# Patient Record
Sex: MALE | Race: WHITE | NOT HISPANIC OR LATINO | Employment: OTHER | URBAN - METROPOLITAN AREA
[De-identification: names, ages, dates, MRNs, and addresses within clinical notes are randomized per-mention and may not be internally consistent; named-entity substitution may affect disease eponyms.]

---

## 2017-09-07 ENCOUNTER — HOSPITAL ENCOUNTER (OUTPATIENT)
Dept: GENERAL RADIOLOGY | Facility: HOSPITAL | Age: 32
Discharge: HOME | End: 2017-09-07
Admitting: NURSE PRACTITIONER

## 2017-09-07 DIAGNOSIS — R52 PAIN: ICD-10-CM

## 2017-09-07 PROCEDURE — 73630 X-RAY EXAM OF FOOT: CPT

## 2020-01-02 ENCOUNTER — TRANSCRIBE ORDERS (OUTPATIENT)
Dept: ADMINISTRATIVE | Facility: HOSPITAL | Age: 35
End: 2020-01-02

## 2020-01-02 ENCOUNTER — HOSPITAL ENCOUNTER (OUTPATIENT)
Dept: GENERAL RADIOLOGY | Facility: HOSPITAL | Age: 35
Discharge: HOME | End: 2020-01-02
Admitting: NURSE PRACTITIONER

## 2020-01-02 DIAGNOSIS — M54.2 PAINFUL NECK: ICD-10-CM

## 2020-01-02 DIAGNOSIS — M54.2 PAINFUL NECK: Primary | ICD-10-CM

## 2020-01-02 PROCEDURE — 72040 X-RAY EXAM NECK SPINE 2-3 VW: CPT

## 2021-08-16 NOTE — PROGRESS NOTES
New Left Knee      Patient: Ayden Knutson        YOB: 1985    Medical Record Number: [id number]        Chief Complaints: left knee pain      History of Present Illness: This is a 36-year-old male who presents complaining of left knee pain states the left knee began in April he took a weird step off a ladder after that had posterior swelling pain is medial and posterior he is tried ice heat over-the-counter medications all with no lasting improvement.  His symptoms are moderate intermittent 4 out of 10 stabbing aching with swelling worse with climbing stairs somewhat better with rest past medical history is marked for high blood pressure        Allergies: No Known Allergies    Medications:   Home Medications:  Current Outpatient Medications on File Prior to Visit   Medication Sig   • cetirizine (zyrTEC) 5 MG tablet Take 5 mg by mouth Daily.   • hydroCHLOROthiazide (HYDRODIURIL) 25 MG tablet Take 25 mg by mouth Daily.   • omeprazole (priLOSEC) 20 MG capsule Take 20 mg by mouth Daily.     No current facility-administered medications on file prior to visit.     Current Medications:  Scheduled Meds:  Continuous Infusions:No current facility-administered medications for this visit.    PRN Meds:.    History reviewed. No pertinent past medical history.   History reviewed. No pertinent surgical history.     Social History     Occupational History   • Not on file   Tobacco Use   • Smoking status: Never Smoker   • Smokeless tobacco: Never Used   Vaping Use   • Vaping Use: Never used   Substance and Sexual Activity   • Alcohol use: Not Currently   • Drug use: Defer   • Sexual activity: Defer      Social History     Social History Narrative   • Not on file        Family History   Problem Relation Age of Onset   • Diabetes Father              Review of Systems: 14 point review of systems are remarkable for the knee pain only the remainder negative per the patient    Review of Systems      Physical Exam: 36  "y.o. male  General Appearance:    Alert, cooperative, in no acute distress                   Vitals:    08/17/21 1602   Temp: 97.7 °F (36.5 °C)   Weight: 120 kg (265 lb)   Height: 185.4 cm (73\")   PainSc:   5      Patient is alert and read ×3 no acute distress appears her above-listed at height weight and age.  Affect is normal respiratory rate is normal unlabored. Heart rate regular rate rhythm, sclera, dentition and hearing are normal for the purpose of this exam.        Ortho Exam Physical exam of the left knee reveals no effusion no redness.  The patient does have tenderness about the medial joint line.  No tenderness about the lateral joint line.  A negative bounce home and a positive medial Leonel.  There is some pain medially  with a lateral Leonel.  Patient has a stable ligamentous exam.  Quads are reasonable and symmetric bilaterally.  Calf is soft and nontender.  There is no overlying skin changes no lymphedema lymphadenopathy.  Patient has good hip range of motion full symmetric and asymptomatic and a normal ankle exam.    Procedures             Radiology:   AP, Lateral and merchant views of the left knee  were ordered/reviewed to evauateknee pain.  I have no comparative films these are normal no evidence of any acute bony pathology  Imaging Results (Most Recent)     Procedure Component Value Units Date/Time    XR Knee 3 View Left [[id number]] Resulted: 08/17/21 1554     Updated: 08/17/21 1600    Impression:      Ordering physician's impression is located in the Encounter Note dated 08/17/21. X-ray performed in the DR room.          Assessment/Plan:  Left knee pain is been ongoing since April he has definitive mechanical symptoms he is tried anti-inflammatories Tylenol rest strengthening stretching all with no lasting improvement plan is to proceed with an MRI                                "

## 2021-08-17 ENCOUNTER — OFFICE VISIT (OUTPATIENT)
Dept: ORTHOPEDIC SURGERY | Facility: CLINIC | Age: 36
End: 2021-08-17

## 2021-08-17 VITALS — TEMPERATURE: 97.7 F | WEIGHT: 265 LBS | BODY MASS INDEX: 35.12 KG/M2 | HEIGHT: 73 IN

## 2021-08-17 DIAGNOSIS — R52 PAIN: Primary | ICD-10-CM

## 2021-08-17 DIAGNOSIS — S83.242A TEAR OF MEDIAL MENISCUS OF LEFT KNEE, CURRENT, UNSPECIFIED TEAR TYPE, INITIAL ENCOUNTER: ICD-10-CM

## 2021-08-17 PROCEDURE — 99203 OFFICE O/P NEW LOW 30 MIN: CPT | Performed by: ORTHOPAEDIC SURGERY

## 2021-08-17 PROCEDURE — 73562 X-RAY EXAM OF KNEE 3: CPT | Performed by: ORTHOPAEDIC SURGERY

## 2021-08-17 RX ORDER — HYDROCHLOROTHIAZIDE 25 MG/1
25 TABLET ORAL DAILY
COMMUNITY

## 2021-08-17 RX ORDER — OMEPRAZOLE 20 MG/1
20 CAPSULE, DELAYED RELEASE ORAL DAILY
COMMUNITY

## 2021-08-17 RX ORDER — CETIRIZINE HYDROCHLORIDE 5 MG/1
5 TABLET ORAL DAILY
COMMUNITY

## 2021-09-09 ENCOUNTER — APPOINTMENT (OUTPATIENT)
Dept: MRI IMAGING | Facility: HOSPITAL | Age: 36
End: 2021-09-09

## 2021-09-30 ENCOUNTER — HOSPITAL ENCOUNTER (OUTPATIENT)
Dept: MRI IMAGING | Facility: HOSPITAL | Age: 36
Discharge: HOME | End: 2021-09-30
Admitting: ORTHOPAEDIC SURGERY

## 2021-09-30 DIAGNOSIS — S83.242A TEAR OF MEDIAL MENISCUS OF LEFT KNEE, CURRENT, UNSPECIFIED TEAR TYPE, INITIAL ENCOUNTER: ICD-10-CM

## 2021-09-30 PROCEDURE — 73721 MRI JNT OF LWR EXTRE W/O DYE: CPT

## 2025-05-14 ENCOUNTER — HOSPITAL ENCOUNTER (EMERGENCY)
Facility: HOSPITAL | Age: 40
Discharge: HOME | End: 2025-05-14
Admitting: EMERGENCY MEDICINE
Payer: COMMERCIAL

## 2025-05-14 ENCOUNTER — APPOINTMENT (OUTPATIENT)
Dept: GENERAL RADIOLOGY | Facility: HOSPITAL | Age: 40
End: 2025-05-14
Payer: COMMERCIAL

## 2025-05-14 VITALS
HEIGHT: 73 IN | TEMPERATURE: 98.3 F | SYSTOLIC BLOOD PRESSURE: 124 MMHG | DIASTOLIC BLOOD PRESSURE: 83 MMHG | BODY MASS INDEX: 34.72 KG/M2 | RESPIRATION RATE: 18 BRPM | OXYGEN SATURATION: 98 % | HEART RATE: 90 BPM | WEIGHT: 262 LBS

## 2025-05-14 DIAGNOSIS — S60.551A FOREIGN BODY OF RIGHT HAND, INITIAL ENCOUNTER: Primary | ICD-10-CM

## 2025-05-14 PROCEDURE — 63710000001 ONDANSETRON ODT 4 MG TABLET DISPERSIBLE: Performed by: PHYSICIAN ASSISTANT

## 2025-05-14 PROCEDURE — 99283 EMERGENCY DEPT VISIT LOW MDM: CPT

## 2025-05-14 PROCEDURE — 96372 THER/PROPH/DIAG INJ SC/IM: CPT

## 2025-05-14 PROCEDURE — 73130 X-RAY EXAM OF HAND: CPT

## 2025-05-14 PROCEDURE — 90471 IMMUNIZATION ADMIN: CPT | Performed by: PHYSICIAN ASSISTANT

## 2025-05-14 PROCEDURE — 25010000002 CEFAZOLIN PER 500 MG: Performed by: PHYSICIAN ASSISTANT

## 2025-05-14 PROCEDURE — 25010000002 TETANUS-DIPHTH-ACELL PERTUSSIS 5-2.5-18.5 LF-MCG/0.5 SUSPENSION PREFILLED SYRINGE: Performed by: PHYSICIAN ASSISTANT

## 2025-05-14 PROCEDURE — 90715 TDAP VACCINE 7 YRS/> IM: CPT | Performed by: PHYSICIAN ASSISTANT

## 2025-05-14 RX ORDER — CEPHALEXIN 500 MG/1
500 CAPSULE ORAL 4 TIMES DAILY
Qty: 20 CAPSULE | Refills: 0 | Status: SHIPPED | OUTPATIENT
Start: 2025-05-14

## 2025-05-14 RX ORDER — CEFAZOLIN SODIUM 1 G/3ML
1 INJECTION, POWDER, FOR SOLUTION INTRAMUSCULAR; INTRAVENOUS ONCE
Status: COMPLETED | OUTPATIENT
Start: 2025-05-14 | End: 2025-05-14

## 2025-05-14 RX ORDER — ONDANSETRON 4 MG/1
8 TABLET, ORALLY DISINTEGRATING ORAL ONCE
Status: COMPLETED | OUTPATIENT
Start: 2025-05-14 | End: 2025-05-14

## 2025-05-14 RX ADMIN — CEFAZOLIN 1 G: 1 INJECTION, POWDER, FOR SOLUTION INTRAMUSCULAR; INTRAVENOUS at 14:12

## 2025-05-14 RX ADMIN — TETANUS TOXOID, REDUCED DIPHTHERIA TOXOID AND ACELLULAR PERTUSSIS VACCINE, ADSORBED 0.5 ML: 5; 2.5; 8; 8; 2.5 SUSPENSION INTRAMUSCULAR at 14:12

## 2025-05-14 RX ADMIN — ONDANSETRON 8 MG: 4 TABLET, ORALLY DISINTEGRATING ORAL at 14:46

## 2025-05-14 NOTE — ED PROVIDER NOTES
Subjective   History of Present Illness  39-year-old male presents emergency room with complaints of foreign body to right hand.  Patient states he was at home using a screw by a screw gun when the dropped a drill causing the screw to penetrate palmar aspect of right hand.  Patient admits to localized numbness however there is no numbness extending out into the fifth finger.        Review of Systems   Skin:  Positive for wound.   Neurological:  Positive for numbness.       No past medical history on file.    No Known Allergies    No past surgical history on file.    Family History   Problem Relation Age of Onset    Diabetes Father        Social History     Socioeconomic History    Marital status:    Tobacco Use    Smoking status: Never    Smokeless tobacco: Never   Vaping Use    Vaping status: Never Used   Substance and Sexual Activity    Alcohol use: Not Currently    Drug use: Defer    Sexual activity: Defer           Objective   Physical Exam  Vitals and nursing note reviewed.   Constitutional:       General: He is not in acute distress.     Appearance: Normal appearance. He is not ill-appearing, toxic-appearing or diaphoretic.   Skin:     Comments: Foreign body located in the palmar aspect of right hand very lateral aspect lateral to the  right fifth metacarpal.   Neurological:      General: No focal deficit present.      Mental Status: He is alert and oriented to person, place, and time.   Psychiatric:         Mood and Affect: Mood normal.         Behavior: Behavior normal.         Foreign Body Removal - Embedded    Date/Time: 5/14/2025 5:24 PM    Performed by: Frank Gonzalez PA-C  Authorized by: Frank Gonzalez PA-C    Consent:     Consent obtained:  Verbal    Consent given by:  Patient    Risks, benefits, and alternatives were discussed: yes      Risks discussed:  Infection and pain  Universal protocol:     Procedure explained and questions answered to patient or proxy's satisfaction: yes       "Immediately prior to procedure, a time out was called: yes      Patient identity confirmed:  Verbally with patient  Location:     Location:  Hand    Hand location:  R palm    Depth:  Subcutaneous    Tendon involvement:  None  Pre-procedure details:     Imaging:  X-ray    Neurovascular status: intact      Preparation: Patient was prepped and draped in usual sterile fashion    Anesthesia:     Anesthesia method:  Local infiltration    Local anesthetic:  Lidocaine 1% w/o epi  Procedure details:     Localization method:  Visualized    Dissection of underlying tissues: no      Bloodless field: yes      Removal mechanism:  Forceps    Foreign bodies recovered:  1    Description:  Screw    Intact foreign body removal: yes    Post-procedure details:     Neurovascular status: intact      Confirmation:  No radiologic foreign bodies identified on post-procedure imaging    Skin closure:  None    Dressing:  Open (no dressing)    Procedure completion:  Tolerated  Screw           ED Course    /83   Pulse 90   Temp 98.3 °F (36.8 °C) (Oral)   Resp 18   Ht 185.4 cm (73\")   Wt 119 kg (262 lb)   SpO2 98%   BMI 34.57 kg/m²   Labs Reviewed - No data to display  Medications   Tetanus-Diphth-Acell Pertussis (BOOSTRIX) injection 0.5 mL (0.5 mL Intramuscular Given 5/14/25 1412)   ceFAZolin (ANCEF) 1 g in sterile water (preservative free) 2.5 mL IM injection (1 g Intramuscular Given 5/14/25 1412)   ondansetron ODT (ZOFRAN-ODT) disintegrating tablet 8 mg (8 mg Oral Given 5/14/25 1446)     XR Hand 3+ View Right  Result Date: 5/14/2025  Impression: Successful removal of the previously seen radiopaque screw in the soft tissues of the hand. Electronically Signed: Ema Erickson MD  5/14/2025 2:38 PM EDT  Workstation ID: [id number]    XR Hand 3+ View Right  Result Date: 5/14/2025  Impression: Radiopaque screw within the soft tissues of the hand near the fifth metacarpal head. Electronically Signed: Ema Erickson MD  5/14/2025 1:50 PM EDT  " Workstation ID: [id number]                                                       Medical Decision Making  39-year-old male presents emergency room with complaints of foreign body to right hand.  Patient states that he had a screw gun with a screw loaded into the gun when it fell from a ladder tried to catch it when the screw penetrated his hand.  Physical exam there is a screw located to palmar aspect of right hand just at the level of the distal fifth medical carpal.  Neurovascular patient is intact with good to point discrimination of the right fifth digit.  Patient has full range of motion of digit before foreign body removal.  Vital signs BP is 124/83 temperature is 98.3 heart rate 90 respirations 18 SpO2 room air is 98%.  ER course x-rays of the right hand reveal a radiopaque screw within the soft tissues of the hand near the fifth metacarpal head.  This area was numbed with lidocaine plain and the screw was backed out rotating the screw in a counterclockwise motion.  Patient tolerated procedure well.  Flip-ray of the extremity states that all foreign bodies removed.  There is no signs of fracture or dislocation.  Patient had full range of motion after screw removal wound was cleansed area was bandaged he was given 1 g of Ancef in the ER setting became nauseated secondary to the injection Zofran 8 mg p.o. given tetanus shot given he was discharged home in stable condition to follow-up with the hand center with a prescription for Keflex for the next 5 days patient was advised that if he has increased redness swelling and/or pain to return to the emergency room immediately.  Patient verbalized understanding instructions he was discharged home in stable condition.  Patient's ER course treatment plan and disposition discussed with Dr. Yu.    Problems Addressed:  Foreign body of right hand, initial encounter: complicated acute illness or injury    Amount and/or Complexity of Data Reviewed  Radiology: ordered.  Decision-making details documented in ED Course.    Risk  Prescription drug management.        Final diagnoses:   Foreign body of right hand, initial encounter       ED Disposition  ED Disposition       ED Disposition   Discharge    Condition   Stable    Comment   --               Sabiha Knutson, APRN  [address] [facility] [address] [phone]      If symptoms worsen    KLEINERT KUTZ [facility] - [address]  [facility] [address]  [phone]  In 1 week           Medication List        New Prescriptions      cephalexin 500 MG capsule  Commonly known as: KEFLEX  Take 1 capsule by mouth 4 (Four) Times a Day.               Where to Get Your Medications        These medications were sent to [facility] [id number] - [place] - [address] - [phone]  - [phone] FX [address], [facility] [address]      Phone: [phone]   cephalexin 500 MG capsule            Frank Gonzalez PA-C  05/14/25 5014